# Patient Record
Sex: FEMALE | Race: OTHER | Employment: UNEMPLOYED | URBAN - METROPOLITAN AREA
[De-identification: names, ages, dates, MRNs, and addresses within clinical notes are randomized per-mention and may not be internally consistent; named-entity substitution may affect disease eponyms.]

---
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
## 2025-03-03 ENCOUNTER — HOSPITAL ENCOUNTER (OUTPATIENT)
Facility: HOSPITAL | Age: 20
Setting detail: SPECIMEN
Discharge: HOME | End: 2025-03-06

## 2025-03-03 ENCOUNTER — OFFICE VISIT (OUTPATIENT)
Age: 20
End: 2025-03-03

## 2025-03-03 VITALS
SYSTOLIC BLOOD PRESSURE: 130 MMHG | DIASTOLIC BLOOD PRESSURE: 82 MMHG | HEIGHT: 61 IN | TEMPERATURE: 98.2 F | HEART RATE: 66 BPM | WEIGHT: 138 LBS | BODY MASS INDEX: 26.06 KG/M2 | OXYGEN SATURATION: 98 %

## 2025-03-03 DIAGNOSIS — N92.6 IRREGULAR PERIODS: ICD-10-CM

## 2025-03-03 DIAGNOSIS — B35.3 TINEA PEDIS, UNSPECIFIED LATERALITY: ICD-10-CM

## 2025-03-03 DIAGNOSIS — N92.6 IRREGULAR PERIODS: Primary | ICD-10-CM

## 2025-03-03 PROCEDURE — 83036 HEMOGLOBIN GLYCOSYLATED A1C: CPT

## 2025-03-03 PROCEDURE — 84403 ASSAY OF TOTAL TESTOSTERONE: CPT

## 2025-03-03 PROCEDURE — 83001 ASSAY OF GONADOTROPIN (FSH): CPT

## 2025-03-03 PROCEDURE — 83002 ASSAY OF GONADOTROPIN (LH): CPT

## 2025-03-03 PROCEDURE — 99213 OFFICE O/P EST LOW 20 MIN: CPT | Performed by: PHYSICIAN ASSISTANT

## 2025-03-03 PROCEDURE — 82627 DEHYDROEPIANDROSTERONE: CPT

## 2025-03-03 PROCEDURE — 84443 ASSAY THYROID STIM HORMONE: CPT

## 2025-03-03 PROCEDURE — 84402 ASSAY OF FREE TESTOSTERONE: CPT

## 2025-03-03 RX ORDER — NYSTATIN 100000 U/G
CREAM TOPICAL
Qty: 30 G | Refills: 1 | Status: SHIPPED | OUTPATIENT
Start: 2025-03-03

## 2025-03-03 SDOH — ECONOMIC STABILITY: FOOD INSECURITY: WITHIN THE PAST 12 MONTHS, THE FOOD YOU BOUGHT JUST DIDN'T LAST AND YOU DIDN'T HAVE MONEY TO GET MORE.: NEVER TRUE

## 2025-03-03 SDOH — ECONOMIC STABILITY: FOOD INSECURITY: WITHIN THE PAST 12 MONTHS, YOU WORRIED THAT YOUR FOOD WOULD RUN OUT BEFORE YOU GOT MONEY TO BUY MORE.: NEVER TRUE

## 2025-03-03 ASSESSMENT — PATIENT HEALTH QUESTIONNAIRE - PHQ9
SUM OF ALL RESPONSES TO PHQ QUESTIONS 1-9: 0
SUM OF ALL RESPONSES TO PHQ QUESTIONS 1-9: 0
2. FEELING DOWN, DEPRESSED OR HOPELESS: NOT AT ALL
SUM OF ALL RESPONSES TO PHQ QUESTIONS 1-9: 0
1. LITTLE INTEREST OR PLEASURE IN DOING THINGS: NOT AT ALL
SUM OF ALL RESPONSES TO PHQ QUESTIONS 1-9: 0

## 2025-03-03 NOTE — PROGRESS NOTES
Assessment/Plan:    Dione was seen today for menstrual problem and foot problem.    Diagnoses and all orders for this visit:    Irregular periods  -     Testosterone, free, total; Future  -     FSH & LH; Future  -     TSH; Future  -     Hemoglobin A1C; Future  -     DHEA-Sulfate; Future    Tinea pedis, unspecified laterality  -     nystatin (MYCOSTATIN) 160235 UNIT/GM cream; Apply topically 2 times daily to feet as needed for rash        No follow-up provider specified.    Annita Terrazas PA-C  Dione VICKY Avalos expressed understanding of this plan. An AVS was printed and given to the patient.      ----------------------------------------------------------------------    Chief Complaint   Patient presents with    Menstrual Problem    Foot Problem     Fungus x 4 months       History of Present Illness:  Pt presents for irregular periods and foot rash  Menarche at age 11. From [age] she had regular periods. Then she gained \"30 lbs between [age]\" and her periods became very irregular, at one point she missed 8 months before spontaneous return of period  She is virginal   She recently missed one month of her period  She is not exercising  Her grandfather is diabetic  She has itchy rash on heels of her feet       Past Medical History:   Diagnosis Date    History of chicken pox     at age 7       Current Outpatient Medications   Medication Sig Dispense Refill    nystatin (MYCOSTATIN) 566884 UNIT/GM cream Apply topically 2 times daily to feet as needed for rash 30 g 1     No current facility-administered medications for this visit.       No Known Allergies    Social History     Tobacco Use    Smoking status: Never     Passive exposure: Never    Smokeless tobacco: Never   Substance Use Topics    Alcohol use: No    Drug use: No       No family history on file.    Physical Exam:     /82 (Site: Right Upper Arm, Position: Sitting, Cuff Size: Large Adult)   Pulse 66   Temp 98.2 °F (36.8 °C) (Temporal)   Ht 1.56 m

## 2025-03-03 NOTE — PROGRESS NOTES
Dione Avalos seen at d/c, full name and  verified, given After visit Summary and reviewed today's visit with patient along with instructions on when it is recommended to come back (Return in about 3 months (around 6/3/2025) for with me. )  Nystatin cream instructions given to patient.  Good Rx coupons given/texted to patient as well as instructions on how to use at the pharmacy.  I have reviewed the provider's instructions with the patient, answering all questions to her satisfaction. Patient verbalized understanding.  Mayi Huang, RN

## 2025-03-04 LAB
EST. AVERAGE GLUCOSE BLD GHB EST-MCNC: 105 MG/DL
FSH SERPL-ACNC: 5.7 MIU/ML
HBA1C MFR BLD: 5.3 % (ref 4–5.6)
LH SERPL-ACNC: 6.8 MIU/ML
TSH SERPL DL<=0.05 MIU/L-ACNC: 0.87 UIU/ML (ref 0.36–3.74)

## 2025-03-05 LAB — DHEA-S SERPL-MCNC: 394 UG/DL (ref 110–433.2)

## 2025-03-07 LAB
TESTOST FREE SERPL-MCNC: 2.3 PG/ML
TESTOST SERPL-MCNC: 40 NG/DL (ref 13–71)